# Patient Record
Sex: MALE
[De-identification: names, ages, dates, MRNs, and addresses within clinical notes are randomized per-mention and may not be internally consistent; named-entity substitution may affect disease eponyms.]

---

## 2017-02-15 ENCOUNTER — APPOINTMENT (OUTPATIENT)
Dept: INTERNAL MEDICINE | Facility: CLINIC | Age: 82
End: 2017-02-15

## 2017-02-15 DIAGNOSIS — Z00.00 ENCOUNTER FOR GENERAL ADULT MEDICAL EXAMINATION W/OUT ABNORMAL FINDINGS: ICD-10-CM

## 2017-02-16 ENCOUNTER — RESULT CHARGE (OUTPATIENT)
Age: 82
End: 2017-02-16

## 2017-02-16 LAB
GLUCOSE UR-MCNC: 0
HGB UR QL STRIP.AUTO: 0
PROT UR STRIP-MCNC: 0
SP GR UR STRIP: 1.02

## 2017-04-20 ENCOUNTER — INPATIENT (INPATIENT)
Facility: HOSPITAL | Age: 82
LOS: 4 days | Discharge: HOME | End: 2017-04-25
Attending: INTERNAL MEDICINE | Admitting: INTERNAL MEDICINE
Payer: MEDICARE

## 2017-04-20 VITALS
TEMPERATURE: 98 F | OXYGEN SATURATION: 95 % | HEART RATE: 99 BPM | SYSTOLIC BLOOD PRESSURE: 122 MMHG | RESPIRATION RATE: 17 BRPM | WEIGHT: 203.05 LBS | HEIGHT: 69 IN | DIASTOLIC BLOOD PRESSURE: 60 MMHG

## 2017-04-20 LAB
ABO RH CONFIRMATION: SIGNIFICANT CHANGE UP
ALBUMIN SERPL ELPH-MCNC: 3.7 G/DL — SIGNIFICANT CHANGE UP (ref 3.3–5)
ALP SERPL-CCNC: 84 U/L — SIGNIFICANT CHANGE UP (ref 40–120)
ALT FLD-CCNC: 46 U/L — SIGNIFICANT CHANGE UP (ref 12–78)
ANION GAP SERPL CALC-SCNC: 13 MMOL/L — SIGNIFICANT CHANGE UP (ref 5–17)
ANISOCYTOSIS BLD QL: SLIGHT — SIGNIFICANT CHANGE UP
AST SERPL-CCNC: 43 U/L — HIGH (ref 15–37)
BILIRUB SERPL-MCNC: 1.2 MG/DL — SIGNIFICANT CHANGE UP (ref 0.2–1.2)
BLD GP AB SCN SERPL QL: SIGNIFICANT CHANGE UP
BLOOD GAS COMMENTS: SIGNIFICANT CHANGE UP
BLOOD GAS SOURCE: SIGNIFICANT CHANGE UP
BUN SERPL-MCNC: 44 MG/DL — HIGH (ref 7–23)
CALCIUM SERPL-MCNC: 8.4 MG/DL — LOW (ref 8.5–10.1)
CHLORIDE SERPL-SCNC: 112 MMOL/L — HIGH (ref 96–108)
CO2 SERPL-SCNC: 19 MMOL/L — LOW (ref 22–31)
CREAT SERPL-MCNC: 1.79 MG/DL — HIGH (ref 0.5–1.3)
D DIMER BLD IA.RAPID-MCNC: 171 NG/ML DDU — SIGNIFICANT CHANGE UP
GLUCOSE SERPL-MCNC: 128 MG/DL — HIGH (ref 70–99)
HCO3 BLDA-SCNC: 16 MMOL/L — LOW (ref 21–29)
HCT VFR BLD CALC: 12.6 % — CRITICAL LOW (ref 39–50)
HGB BLD-MCNC: 4.7 G/DL — CRITICAL LOW (ref 13–17)
HOROWITZ INDEX BLDA+IHG-RTO: 21 — SIGNIFICANT CHANGE UP
HYPOCHROMIA BLD QL: SIGNIFICANT CHANGE UP
INR BLD: 1.69 RATIO — HIGH (ref 0.88–1.16)
LACTATE SERPL-SCNC: 1.7 MMOL/L — SIGNIFICANT CHANGE UP (ref 0.7–2)
LYMPHOCYTES # BLD AUTO: 35 % — SIGNIFICANT CHANGE UP (ref 13–44)
MACROCYTES BLD QL: SLIGHT — SIGNIFICANT CHANGE UP
MCHC RBC-ENTMCNC: 36.3 PG — HIGH (ref 27–34)
MCHC RBC-ENTMCNC: 37.4 GM/DL — HIGH (ref 32–36)
MCV RBC AUTO: 97 FL — SIGNIFICANT CHANGE UP (ref 80–100)
MICROCYTES BLD QL: SLIGHT — SIGNIFICANT CHANGE UP
MONOCYTES NFR BLD AUTO: 1 % — LOW (ref 2–14)
NEUTROPHILS NFR BLD AUTO: 64 % — SIGNIFICANT CHANGE UP (ref 43–77)
OVALOCYTES BLD QL SMEAR: SLIGHT — SIGNIFICANT CHANGE UP
PCO2 BLDA: 18 MMHG — LOW (ref 32–46)
PH BLD: 7.56 — HIGH (ref 7.35–7.45)
PLAT MORPH BLD: NORMAL — SIGNIFICANT CHANGE UP
PLATELET # BLD AUTO: 303 K/UL — SIGNIFICANT CHANGE UP (ref 150–400)
PO2 BLDA: 130 MMHG — HIGH (ref 74–108)
POTASSIUM SERPL-MCNC: 4.3 MMOL/L — SIGNIFICANT CHANGE UP (ref 3.5–5.3)
POTASSIUM SERPL-SCNC: 4.3 MMOL/L — SIGNIFICANT CHANGE UP (ref 3.5–5.3)
PROT SERPL-MCNC: 6 GM/DL — SIGNIFICANT CHANGE UP (ref 6–8.3)
PROTHROM AB SERPL-ACNC: 18.6 SEC — HIGH (ref 9.8–12.7)
RBC # BLD: 1.3 M/UL — LOW (ref 4.2–5.8)
RBC # FLD: 18.6 % — HIGH (ref 11–15)
RBC BLD AUTO: ABNORMAL
SAO2 % BLDA: 99 % — HIGH (ref 92–96)
SODIUM SERPL-SCNC: 144 MMOL/L — SIGNIFICANT CHANGE UP (ref 135–145)
WBC # BLD: 3.2 K/UL — LOW (ref 3.8–10.5)
WBC # FLD AUTO: 3.2 K/UL — LOW (ref 3.8–10.5)

## 2017-04-20 PROCEDURE — 99285 EMERGENCY DEPT VISIT HI MDM: CPT

## 2017-04-20 PROCEDURE — 71010: CPT | Mod: 26

## 2017-04-20 RX ORDER — SODIUM CHLORIDE 9 MG/ML
1000 INJECTION, SOLUTION INTRAVENOUS
Qty: 0 | Refills: 0 | Status: DISCONTINUED | OUTPATIENT
Start: 2017-04-20 | End: 2017-04-25

## 2017-04-20 NOTE — H&P ADULT. - ASSESSMENT
89 years old male is admitted for severe anemia, acute renal insufficiency, h/o ASHD  plan: IV hydration. PRBC transfusion. Anemia workup

## 2017-04-20 NOTE — ED PROVIDER NOTE - OBJECTIVE STATEMENT
89 yo male presents with diffculty breathing over last few days. Patient denies chest pain, shortness of breath, fever, chills, fall.

## 2017-04-20 NOTE — H&P ADULT. - HISTORY OF PRESENT ILLNESS
89 years old male with h/o ASHD, s/p CABG, recurrent anemia, s/p transfusion at [facility] come to ER for dyspnea

## 2017-04-20 NOTE — ED ADULT NURSE REASSESSMENT NOTE - NS ED NURSE REASSESS COMMENT FT1
Patient states that he cannot recall which medications he is taking, he cannot recall which pharmacy he uses either.

## 2017-04-20 NOTE — ED ADULT NURSE NOTE - OBJECTIVE STATEMENT
Patient appears alert, oriented x 2-3, no complaints of pain but states right leg feels cold and as of this morning he feels like he cant walk on it properly. States the cold wind hitting his leg makes it worse.

## 2017-04-20 NOTE — ED ADULT TRIAGE NOTE - CHIEF COMPLAINT QUOTE
intermittent sob on exertion with dizziness, denies any chest pain at triage. No apparent respiratory distress noted at triage

## 2017-04-21 DIAGNOSIS — D64.9 ANEMIA, UNSPECIFIED: ICD-10-CM

## 2017-04-21 LAB
ANION GAP SERPL CALC-SCNC: 14 MMOL/L — SIGNIFICANT CHANGE UP (ref 5–17)
BASOPHILS NFR BLD AUTO: SIGNIFICANT CHANGE UP % (ref 0–2)
BUN SERPL-MCNC: 41 MG/DL — HIGH (ref 7–23)
CALCIUM SERPL-MCNC: 8.1 MG/DL — LOW (ref 8.5–10.1)
CHLORIDE SERPL-SCNC: 113 MMOL/L — HIGH (ref 96–108)
CO2 SERPL-SCNC: 18 MMOL/L — LOW (ref 22–31)
CREAT SERPL-MCNC: 1.66 MG/DL — HIGH (ref 0.5–1.3)
EOSINOPHIL NFR BLD AUTO: SIGNIFICANT CHANGE UP % (ref 0–6)
FERRITIN SERPL-MCNC: 925.6 NG/ML — HIGH (ref 30–400)
FOLATE SERPL-MCNC: 12.9 NG/ML — SIGNIFICANT CHANGE UP (ref 4.8–24.2)
GLUCOSE SERPL-MCNC: 106 MG/DL — HIGH (ref 70–99)
HCT VFR BLD CALC: 19 % — CRITICAL LOW (ref 39–50)
HGB BLD-MCNC: 7.2 G/DL — LOW (ref 13–17)
IRON SATN MFR SERPL: 204 UG/DL — HIGH (ref 45–165)
IRON SATN MFR SERPL: SIGNIFICANT CHANGE UP % (ref 16–55)
LYMPHOCYTES # BLD AUTO: SIGNIFICANT CHANGE UP % (ref 13–44)
MCHC RBC-ENTMCNC: 35.9 PG — HIGH (ref 27–34)
MCHC RBC-ENTMCNC: 37.7 GM/DL — HIGH (ref 32–36)
MCV RBC AUTO: 95.3 FL — SIGNIFICANT CHANGE UP (ref 80–100)
MONOCYTES NFR BLD AUTO: SIGNIFICANT CHANGE UP % (ref 2–14)
NEUTROPHILS NFR BLD AUTO: SIGNIFICANT CHANGE UP % (ref 43–77)
PLATELET # BLD AUTO: 273 K/UL — SIGNIFICANT CHANGE UP (ref 150–400)
POTASSIUM SERPL-MCNC: 4 MMOL/L — SIGNIFICANT CHANGE UP (ref 3.5–5.3)
POTASSIUM SERPL-SCNC: 4 MMOL/L — SIGNIFICANT CHANGE UP (ref 3.5–5.3)
RBC # BLD: 1.95 M/UL — LOW (ref 4.2–5.8)
RBC # BLD: 1.99 M/UL — LOW (ref 4.2–5.8)
RBC # FLD: 17 % — HIGH (ref 11–15)
RETICS #: 22 K/UL — LOW (ref 25–125)
RETICS/RBC NFR: 1.1 % — SIGNIFICANT CHANGE UP (ref 0.5–2.5)
SODIUM SERPL-SCNC: 145 MMOL/L — SIGNIFICANT CHANGE UP (ref 135–145)
TIBC SERPL-MCNC: SIGNIFICANT CHANGE UP UG/DL (ref 220–430)
UIBC SERPL-MCNC: <10 UG/DL — LOW (ref 110–370)
VIT B12 SERPL-MCNC: >2000 PG/ML — HIGH (ref 243–894)
WBC # BLD: 6.4 K/UL — SIGNIFICANT CHANGE UP (ref 3.8–10.5)
WBC # FLD AUTO: 6.4 K/UL — SIGNIFICANT CHANGE UP (ref 3.8–10.5)

## 2017-04-21 PROCEDURE — 93306 TTE W/DOPPLER COMPLETE: CPT | Mod: 26

## 2017-04-21 RX ORDER — PANTOPRAZOLE SODIUM 20 MG/1
40 TABLET, DELAYED RELEASE ORAL
Qty: 0 | Refills: 0 | Status: DISCONTINUED | OUTPATIENT
Start: 2017-04-21 | End: 2017-04-25

## 2017-04-21 RX ORDER — SUCRALFATE 1 G
1 TABLET ORAL
Qty: 0 | Refills: 0 | Status: DISCONTINUED | OUTPATIENT
Start: 2017-04-21 | End: 2017-04-25

## 2017-04-21 RX ADMIN — SODIUM CHLORIDE 50 MILLILITER(S): 9 INJECTION, SOLUTION INTRAVENOUS at 06:32

## 2017-04-21 NOTE — CONSULT NOTE ADULT - SUBJECTIVE AND OBJECTIVE BOX
REASON FOR CONSULTATION:    Anemia.      MEDICATIONS  (STANDING):  dextrose 5% + sodium chloride 0.45%. 1000milliLiter(s) IV Continuous <Continuous>    MEDICATIONS  (PRN):      Vital Signs Last 24 Hrs  T(C): 36.2, Max: 36.9 (04-20 @ 20:15)  T(F): 97.2, Max: 98.5 (04-20 @ 20:15)  HR: 67 (61 - 99)  BP: 125/85 (118/45 - 145/46)  BP(mean): --  RR: 18 (17 - 22)  SpO2: 99% (95% - 100%)    PHYSICAL EXAM:  Pallor.        EYES: EOMI, PERRLA, conjunctiva and sclera clear    NECK: Supple, No JVD, Normal thyroid  CHEST/LUNG: Clear to percussion bilaterally;   HEART: Regular rate and rhythm;   ABDOMEN: Soft, Nontender, Nondistended;   EXTREMITIES:  Ecchymoses.  LYMPH: No lymphadenopathy noted        LABS:                        7.2    6.4   )-----------( 273      ( 21 Apr 2017 06:54 )             19.0     04-21    145  |  113<H>  |  41<H>  ----------------------------<  106<H>  4.0   |  18<L>  |  1.66<H>    Ca    8.1<L>      21 Apr 2017 06:54    TPro  6.0  /  Alb  3.7  /  TBili  1.2  /  DBili  x   /  AST  43<H>  /  ALT  46  /  AlkPhos  84  04-20    PT/INR - ( 20 Apr 2017 15:46 )   PT: 18.6 sec;   INR: 1.69 ratio                 RADIOLOGY & ADDITIONAL STUDIES:    PATHOLOGY:

## 2017-04-21 NOTE — PROGRESS NOTE ADULT - SUBJECTIVE AND OBJECTIVE BOX
Patient is a 89y old  Male who presents with a chief complaint of Dyspnea (20 Apr 2017 20:08)    MEDICAL PROBLEMS:  ANEMIA  SHORTNESS OF BREATH  Anemia  Anemia  Anemia      INTERVAL HPI/OVERNIGHT EVENTS: Received 2 u PRBC. Hba1c: 7.5. No sign of GI bleeding    MEDICATIONS  (STANDING):  dextrose 5% + sodium chloride 0.45%. 1000milliLiter(s) IV Continuous <Continuous>    MEDICATIONS  (PRN):    Allergies    No Known Allergies    Intolerances      Vital Signs Last 24 Hrs  T(C): 36.2, Max: 36.9 (04-20 @ 20:15)  T(F): 97.2, Max: 98.5 (04-20 @ 20:15)  HR: 67 (61 - 99)  BP: 125/85 (118/45 - 145/46)  BP(mean): --  RR: 18 (17 - 22)  SpO2: 99% (95% - 100%)    PHYSICAL EXAM:  GENERAL: NAD, well-groomed, well-developed  HEAD:  Atraumatic, Normocephalic  EYES: EOMI, PERRLA, conjunctiva and sclera clear  ENMT: No tonsillar erythema, exudates, or enlargement; Moist mucous membranes, Good dentition, No lesions  NECK: Supple, No JVD, Normal thyroid  NERVOUS SYSTEM:  Alert & Oriented X3, Good concentration; Motor Strength 5/5 B/L upper and lower extremities; DTRs 2+ intact and symmetric  CHEST/LUNG: Clear to percussion bilaterally; No rales, rhonchi, wheezing, or rubs  HEART: Regular rate and rhythm; No murmurs, rubs, or gallops  ABDOMEN: Soft, Nontender, Nondistended; Bowel sounds present  EXTREMITIES:  2+ Peripheral Pulses, No clubbing, cyanosis, or edema  LYMPH: No lymphadenopathy noted  SKIN: No rashes or lesions  I & Os for 24h ending 04-21 @ 07:00  =============================================  IN: 817 ml / OUT: 200 ml / NET: 617 ml    I & Os for current day (as of 04-21 @ 11:43)  =============================================  IN: 380 ml / OUT: 0 ml / NET: 380 ml      LABS:                        7.2    6.4   )-----------( 273      ( 21 Apr 2017 06:54 )             19.0     04-21    145  |  113<H>  |  41<H>  ----------------------------<  106<H>  4.0   |  18<L>  |  1.66<H>    Ca    8.1<L>      21 Apr 2017 06:54    TPro  6.0  /  Alb  3.7  /  TBili  1.2  /  DBili  x   /  AST  43<H>  /  ALT  46  /  AlkPhos  84  04-20    PT/INR - ( 20 Apr 2017 15:46 )   PT: 18.6 sec;   INR: 1.69 ratio             CAPILLARY BLOOD GLUCOSE    RADIOLOGY & ADDITIONAL TESTS:    Imaging Personally Reviewed:  [X] YES  [ ] NO    Consultant(s) Notes Reviewed:  [X] YES  [ ] NO    Care Discussed with Consultants/Other Providers [X] YES  [ ] NO

## 2017-04-22 LAB
ALBUMIN SERPL ELPH-MCNC: 3.4 G/DL — SIGNIFICANT CHANGE UP (ref 3.3–5)
ALP SERPL-CCNC: 95 U/L — SIGNIFICANT CHANGE UP (ref 40–120)
ALT FLD-CCNC: 47 U/L — SIGNIFICANT CHANGE UP (ref 12–78)
ANION GAP SERPL CALC-SCNC: 10 MMOL/L — SIGNIFICANT CHANGE UP (ref 5–17)
AST SERPL-CCNC: 50 U/L — HIGH (ref 15–37)
BASOPHILS # BLD AUTO: 0.1 K/UL — SIGNIFICANT CHANGE UP (ref 0–0.2)
BASOPHILS NFR BLD AUTO: 1 % — SIGNIFICANT CHANGE UP (ref 0–2)
BILIRUB SERPL-MCNC: 2.4 MG/DL — HIGH (ref 0.2–1.2)
BUN SERPL-MCNC: 26 MG/DL — HIGH (ref 7–23)
CALCIUM SERPL-MCNC: 8.1 MG/DL — LOW (ref 8.5–10.1)
CHLORIDE SERPL-SCNC: 114 MMOL/L — HIGH (ref 96–108)
CO2 SERPL-SCNC: 21 MMOL/L — LOW (ref 22–31)
CREAT SERPL-MCNC: 1.34 MG/DL — HIGH (ref 0.5–1.3)
EOSINOPHIL # BLD AUTO: 0.3 K/UL — SIGNIFICANT CHANGE UP (ref 0–0.5)
EOSINOPHIL NFR BLD AUTO: 3 % — SIGNIFICANT CHANGE UP (ref 0–6)
FOLATE SERPL-MCNC: 10.8 NG/ML — SIGNIFICANT CHANGE UP (ref 4.8–24.2)
GLUCOSE SERPL-MCNC: 128 MG/DL — HIGH (ref 70–99)
HCT VFR BLD CALC: 26.9 % — LOW (ref 39–50)
HGB BLD-MCNC: 9.8 G/DL — LOW (ref 13–17)
IRON SATN MFR SERPL: 200 UG/DL — HIGH (ref 45–165)
IRON SATN MFR SERPL: 95 % — HIGH (ref 16–55)
LYMPHOCYTES # BLD AUTO: 0.7 K/UL — LOW (ref 1–3.3)
LYMPHOCYTES # BLD AUTO: 8.3 % — LOW (ref 13–44)
MCHC RBC-ENTMCNC: 33.8 PG — SIGNIFICANT CHANGE UP (ref 27–34)
MCHC RBC-ENTMCNC: 36.6 GM/DL — HIGH (ref 32–36)
MCV RBC AUTO: 92.5 FL — SIGNIFICANT CHANGE UP (ref 80–100)
MONOCYTES # BLD AUTO: 0.5 K/UL — SIGNIFICANT CHANGE UP (ref 0–0.9)
MONOCYTES NFR BLD AUTO: 5.8 % — SIGNIFICANT CHANGE UP (ref 2–14)
NEUTROPHILS # BLD AUTO: 6.8 K/UL — SIGNIFICANT CHANGE UP (ref 1.8–7.4)
NEUTROPHILS NFR BLD AUTO: 77.9 % — HIGH (ref 43–77)
PLATELET # BLD AUTO: 274 K/UL — SIGNIFICANT CHANGE UP (ref 150–400)
POTASSIUM SERPL-MCNC: 3.6 MMOL/L — SIGNIFICANT CHANGE UP (ref 3.5–5.3)
POTASSIUM SERPL-SCNC: 3.6 MMOL/L — SIGNIFICANT CHANGE UP (ref 3.5–5.3)
PROT SERPL-MCNC: 5.7 G/DL — LOW (ref 6–8.3)
PROT SERPL-MCNC: 5.7 G/DL — LOW (ref 6–8.3)
PROT SERPL-MCNC: 5.8 GM/DL — LOW (ref 6–8.3)
RBC # BLD: 2.91 M/UL — LOW (ref 4.2–5.8)
RBC # FLD: 19 % — HIGH (ref 11–15)
SODIUM SERPL-SCNC: 145 MMOL/L — SIGNIFICANT CHANGE UP (ref 135–145)
TIBC SERPL-MCNC: 210 UG/DL — LOW (ref 220–430)
UIBC SERPL-MCNC: 10 UG/DL — LOW (ref 110–370)
VIT B12 SERPL-MCNC: >2000 PG/ML — HIGH (ref 243–894)
WBC # BLD: 8.8 K/UL — SIGNIFICANT CHANGE UP (ref 3.8–10.5)
WBC # FLD AUTO: 8.8 K/UL — SIGNIFICANT CHANGE UP (ref 3.8–10.5)

## 2017-04-22 RX ADMIN — SODIUM CHLORIDE 50 MILLILITER(S): 9 INJECTION, SOLUTION INTRAVENOUS at 06:09

## 2017-04-22 NOTE — PROGRESS NOTE ADULT - SUBJECTIVE AND OBJECTIVE BOX
Patient is a 89y old  Male who presents with a chief complaint of Dyspnea (20 Apr 2017 20:08)    MEDICAL PROBLEMS:  ANEMIA  SHORTNESS OF BREATH  Anemia  Anemia  Anemia      INTERVAL HPI/OVERNIGHT EVENTS: Received total 4 u PRBC, hb: 9.8 today. 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 1000milliLiter(s) IV Continuous <Continuous>  pantoprazole    Tablet 40milliGRAM(s) Oral before breakfast  sucralfate suspension 1Gram(s) Oral four times a day    MEDICATIONS  (PRN):    Allergies    No Known Allergies    Intolerances      Vital Signs Last 24 Hrs  T(C): 36.4, Max: 37.1 (04-21 @ 16:01)  T(F): 97.6, Max: 98.8 (04-21 @ 16:01)  HR: 77 (65 - 98)  BP: 131/62 (121/65 - 140/65)  BP(mean): --  RR: 18 (18 - 18)  SpO2: 98% (96% - 100%)    PHYSICAL EXAM:  GENERAL: NAD, well-groomed, well-developed  HEAD:  Atraumatic, Normocephalic  EYES: EOMI, PERRLA, conjunctiva and sclera clear  ENMT: No tonsillar erythema, exudates, or enlargement; Moist mucous membranes, Good dentition, No lesions  NECK: Supple, No JVD, Normal thyroid  NERVOUS SYSTEM:  Alert & Oriented X3, Good concentration; Motor Strength 5/5 B/L upper and lower extremities; DTRs 2+ intact and symmetric  CHEST/LUNG: Clear to percussion bilaterally; No rales, rhonchi, wheezing, or rubs  HEART: Regular rate and rhythm; No murmurs, rubs, or gallops  ABDOMEN: Soft, Nontender, Nondistended; Bowel sounds present  EXTREMITIES:  2+ Peripheral Pulses, No clubbing, cyanosis, or edema  LYMPH: No lymphadenopathy noted  SKIN: No rashes or lesions    I & Os for current day (as of 04-22 @ 09:45)  =============================================  IN: 660 ml / OUT: 1000 ml / NET: -340 ml      LABS:                        9.8    8.8   )-----------( 274      ( 22 Apr 2017 08:24 )             26.9     04-22    145  |  114<H>  |  26<H>  ----------------------------<  128<H>  3.6   |  21<L>  |  1.34<H>    Ca    8.1<L>      22 Apr 2017 08:24    TPro  5.8<L>  /  Alb  3.4  /  TBili  2.4<H>  /  DBili  x   /  AST  50<H>  /  ALT  47  /  AlkPhos  95  04-22    PT/INR - ( 20 Apr 2017 15:46 )   PT: 18.6 sec;   INR: 1.69 ratio             CAPILLARY BLOOD GLUCOSE    RADIOLOGY & ADDITIONAL TESTS:    Imaging Personally Reviewed:  [X] YES  [ ] NO    Consultant(s) Notes Reviewed:  [X] YES  [ ] NO    Care Discussed with Consultants/Other Providers [X] YES  [ ] NO

## 2017-04-22 NOTE — PROGRESS NOTE ADULT - SUBJECTIVE AND OBJECTIVE BOX
INTERVAL HISTORY:    Weak.  Anemic      MEDICATIONS  (STANDING):  dextrose 5% + sodium chloride 0.45%. 1000milliLiter(s) IV Continuous <Continuous>  pantoprazole    Tablet 40milliGRAM(s) Oral before breakfast  sucralfate suspension 1Gram(s) Oral four times a day    MEDICATIONS  (PRN):      Vital Signs Last 24 Hrs  T(C): 36.1, Max: 37.1 (04-21 @ 18:46)  T(F): 97, Max: 98.8 (04-21 @ 18:46)  HR: 73 (65 - 98)  BP: 153/74 (118/50 - 153/74)  BP(mean): --  RR: 20 (18 - 21)  SpO2: 99% (98% - 100%)    PHYSICAL EXAM:    GENERAL: Pallor.  HEAD:  Atraumatic, Normocephalic  EYES: EOMI, PERRLA, conjunctiva and sclera clear    NECK: Supple, No JVD, Normal thyroid     CHEST/LUNG: Clear to percussion bilaterally; No rales, rhonchi,   HEART: Regular rate and rhythm;   ABDOMEN: Soft, Nontender.    LYMPH: No lymphadenopathy noted        LABS:                        9.8    8.8   )-----------( 274      ( 22 Apr 2017 08:24 )             26.9     04-22    145  |  114<H>  |  26<H>  ----------------------------<  128<H>  3.6   |  21<L>  |  1.34<H>    Ca    8.1<L>      22 Apr 2017 08:24    TPro  5.7<L>  /  Alb  x   /  TBili  x   /  DBili  x   /  AST  x   /  ALT  x   /  AlkPhos  x   04-22            RADIOLOGY & ADDITIONAL STUDIES:    PATHOLOGY:

## 2017-04-23 ENCOUNTER — RESULT REVIEW (OUTPATIENT)
Age: 82
End: 2017-04-23

## 2017-04-23 RX ADMIN — SODIUM CHLORIDE 50 MILLILITER(S): 9 INJECTION, SOLUTION INTRAVENOUS at 05:08

## 2017-04-23 NOTE — PROGRESS NOTE ADULT - SUBJECTIVE AND OBJECTIVE BOX
INTERVAL HISTORY:    Pallor.      MEDICATIONS  (STANDING):  dextrose 5% + sodium chloride 0.45%. 1000milliLiter(s) IV Continuous <Continuous>  pantoprazole    Tablet 40milliGRAM(s) Oral before breakfast  sucralfate suspension 1Gram(s) Oral four times a day    MEDICATIONS  (PRN):      Vital Signs Last 24 Hrs  T(C): 36.1, Max: 36.6 (04-22 @ 23:57)  T(F): 97, Max: 97.8 (04-22 @ 23:57)  HR: 64 (64 - 73)  BP: 125/55 (114/57 - 153/74)  BP(mean): --  RR: 18 (18 - 21)  SpO2: 98% (98% - 100%)    PHYSICAL EXAM:       HEAD:  Atraumatic, Normocephalic  EYES: EOMI, PERRLA, conjunctiva and sclera clear    NECK: Supple, No JVD, Normal thyroid  NERVOUS SYSTEM:    CHEST/LUNG: Clear to percussion bilaterally; No rales, rhonchi,   HEART: Regular rate and rhythm;   ABDOMEN: Soft, Nontender.  LYMPH: No lymphadenopathy noted        LABS:                        9.8    8.8   )-----------( 274      ( 22 Apr 2017 08:24 )             26.9     04-22    145  |  114<H>  |  26<H>  ----------------------------<  128<H>  3.6   |  21<L>  |  1.34<H>    Ca    8.1<L>      22 Apr 2017 08:24    TPro  5.7<L>  /  Alb  x   /  TBili  x   /  DBili  x   /  AST  x   /  ALT  x   /  AlkPhos  x   04-22            RADIOLOGY & ADDITIONAL STUDIES:    PATHOLOGY:

## 2017-04-23 NOTE — PROGRESS NOTE ADULT - SUBJECTIVE AND OBJECTIVE BOX
Patient is a 89y old  Male who presents with a chief complaint of Dyspnea (20 Apr 2017 20:08)    MEDICAL PROBLEMS:  ANEMIA  SHORTNESS OF BREATH  Anemia  Anemia  Anemia      INTERVAL HPI/OVERNIGHT EVENTS: Alert, OOB to chair.    MEDICATIONS  (STANDING):  dextrose 5% + sodium chloride 0.45%. 1000milliLiter(s) IV Continuous <Continuous>  pantoprazole    Tablet 40milliGRAM(s) Oral before breakfast  sucralfate suspension 1Gram(s) Oral four times a day    MEDICATIONS  (PRN):    Allergies    No Known Allergies    Intolerances      Vital Signs Last 24 Hrs  T(C): 36.1, Max: 36.6 (04-22 @ 23:57)  T(F): 97, Max: 97.8 (04-22 @ 23:57)  HR: 64 (64 - 73)  BP: 125/55 (114/57 - 153/74)  BP(mean): --  RR: 18 (18 - 21)  SpO2: 98% (98% - 100%)    PHYSICAL EXAM:  GENERAL: NAD, well-groomed, well-developed  HEAD:  Atraumatic, Normocephalic  EYES: EOMI, PERRLA, conjunctiva and sclera clear  ENMT: No tonsillar erythema, exudates, or enlargement; Moist mucous membranes, Good dentition, No lesions  NECK: Supple, No JVD, Normal thyroid  NERVOUS SYSTEM:  Alert & Oriented X3, Good concentration; Motor Strength 5/5 B/L upper and lower extremities; DTRs 2+ intact and symmetric  CHEST/LUNG: Clear to percussion bilaterally; No rales, rhonchi, wheezing, or rubs  HEART: Regular rate and rhythm; No murmurs, rubs, or gallops  ABDOMEN: Soft, Nontender, Nondistended; Bowel sounds present  EXTREMITIES:  2+ Peripheral Pulses, No clubbing, cyanosis, or edema  LYMPH: No lymphadenopathy noted  SKIN: No rashes or lesions  I & Os for 24h ending 04-23 @ 07:00  =============================================  IN: 860 ml / OUT: 900 ml / NET: -40 ml    I & Os for current day (as of 04-23 @ 11:14)  =============================================  IN: 240 ml / OUT: 0 ml / NET: 240 ml      LABS:                        9.8    8.8   )-----------( 274      ( 22 Apr 2017 08:24 )             26.9     04-22    145  |  114<H>  |  26<H>  ----------------------------<  128<H>  3.6   |  21<L>  |  1.34<H>    Ca    8.1<L>      22 Apr 2017 08:24    TPro  5.7<L>  /  Alb  x   /  TBili  x   /  DBili  x   /  AST  x   /  ALT  x   /  AlkPhos  x   04-22        CAPILLARY BLOOD GLUCOSE    RADIOLOGY & ADDITIONAL TESTS:    Imaging Personally Reviewed:  [X] YES  [ ] NO    Consultant(s) Notes Reviewed:  [X] YES  [ ] NO    Care Discussed with Consultants/Other Providers [X] YES  [ ] NO

## 2017-04-24 DIAGNOSIS — D64.9 ANEMIA, UNSPECIFIED: ICD-10-CM

## 2017-04-24 LAB
% ALBUMIN: 62.3 % — SIGNIFICANT CHANGE UP
% ALPHA 1: 4.7 % — SIGNIFICANT CHANGE UP
% ALPHA 2: 9.7 % — SIGNIFICANT CHANGE UP
% BETA: 9.3 % — SIGNIFICANT CHANGE UP
% GAMMA: 14 % — SIGNIFICANT CHANGE UP
ALBUMIN SERPL ELPH-MCNC: 3.6 G/DL — SIGNIFICANT CHANGE UP (ref 3.6–5.5)
ALBUMIN/GLOB SERPL ELPH: 1.7 RATIO — SIGNIFICANT CHANGE UP
ALPHA1 GLOB SERPL ELPH-MCNC: 0.3 G/DL — SIGNIFICANT CHANGE UP (ref 0.1–0.4)
ALPHA2 GLOB SERPL ELPH-MCNC: 0.6 G/DL — SIGNIFICANT CHANGE UP (ref 0.5–1)
ANISOCYTOSIS BLD QL: SLIGHT — SIGNIFICANT CHANGE UP
B-GLOBULIN SERPL ELPH-MCNC: 0.5 G/DL — SIGNIFICANT CHANGE UP (ref 0.5–1)
EOSINOPHIL NFR BLD AUTO: 11 % — HIGH (ref 0–6)
GAMMA GLOBULIN: 0.8 G/DL — SIGNIFICANT CHANGE UP (ref 0.6–1.6)
HCT VFR BLD CALC: 23.8 % — LOW (ref 39–50)
HGB BLD-MCNC: 8.8 G/DL — LOW (ref 13–17)
HYPOCHROMIA BLD QL: SLIGHT — SIGNIFICANT CHANGE UP
LYMPHOCYTES # BLD AUTO: 38 % — SIGNIFICANT CHANGE UP (ref 13–44)
MCHC RBC-ENTMCNC: 34.1 PG — HIGH (ref 27–34)
MCHC RBC-ENTMCNC: 36.8 GM/DL — HIGH (ref 32–36)
MCV RBC AUTO: 92.8 FL — SIGNIFICANT CHANGE UP (ref 80–100)
MICROCYTES BLD QL: SLIGHT — SIGNIFICANT CHANGE UP
MONOCYTES NFR BLD AUTO: 6 % — SIGNIFICANT CHANGE UP (ref 2–14)
NEUTROPHILS NFR BLD AUTO: 45 % — SIGNIFICANT CHANGE UP (ref 43–77)
PLAT MORPH BLD: NORMAL — SIGNIFICANT CHANGE UP
PLATELET # BLD AUTO: 222 K/UL — SIGNIFICANT CHANGE UP (ref 150–400)
PROT PATTERN SERPL ELPH-IMP: SIGNIFICANT CHANGE UP
RBC # BLD: 2.57 M/UL — LOW (ref 4.2–5.8)
RBC # FLD: 17.3 % — HIGH (ref 11–15)
RBC BLD AUTO: ABNORMAL
WBC # BLD: 6.9 K/UL — SIGNIFICANT CHANGE UP (ref 3.8–10.5)
WBC # FLD AUTO: 6.9 K/UL — SIGNIFICANT CHANGE UP (ref 3.8–10.5)

## 2017-04-24 PROCEDURE — 88313 SPECIAL STAINS GROUP 2: CPT | Mod: 26

## 2017-04-24 PROCEDURE — 85097 BONE MARROW INTERPRETATION: CPT

## 2017-04-24 PROCEDURE — 88342 IMHCHEM/IMCYTCHM 1ST ANTB: CPT | Mod: 26,59

## 2017-04-24 PROCEDURE — 88360 TUMOR IMMUNOHISTOCHEM/MANUAL: CPT | Mod: 26

## 2017-04-24 PROCEDURE — 77012 CT SCAN FOR NEEDLE BIOPSY: CPT | Mod: 26

## 2017-04-24 PROCEDURE — 88311 DECALCIFY TISSUE: CPT | Mod: 26

## 2017-04-24 PROCEDURE — 88341 IMHCHEM/IMCYTCHM EA ADD ANTB: CPT | Mod: 26,59

## 2017-04-24 PROCEDURE — 88305 TISSUE EXAM BY PATHOLOGIST: CPT | Mod: 26

## 2017-04-24 PROCEDURE — 10022: CPT

## 2017-04-24 PROCEDURE — 99222 1ST HOSP IP/OBS MODERATE 55: CPT

## 2017-04-24 NOTE — PROGRESS NOTE ADULT - SUBJECTIVE AND OBJECTIVE BOX
Patient s/p posterior right iliac bone biopsy and marrow aspiration    Operators: Yasmeen Nicholas MD    Findings: core bone x1, marrow aspirate x2    Complications: None.    Blood loss: < 20 ml      ASSESSMENT:  88 y/o male with persistent anemia s/p transfusion    PLAN:  s/p bone marrow aspiration and biopsy  f/u results  f/u Hem/Onc

## 2017-04-24 NOTE — CONSULT NOTE ADULT - SUBJECTIVE AND OBJECTIVE BOX
Patient is a 89y old  Male who presents with a chief complaint of Dyspnea (20 Apr 2017 20:08)      HPI:  89 years old male with h/o ASHD, s/p CABG, recurrent anemia, s/p transfusion at [facility] come to ER for dyspnea (20 Apr 2017 20:08)    IR consulted for bone marrow bx/aspirate.  Pt denies any fevers, night sweats, fevers, h/o cancers+ itchiness to forearms.      ROS  as above    PAST MEDICAL & SURGICAL HISTORY:  Anemia      Allergies    No Known Allergies    Intolerances        MEDICATIONS  (STANDING):  dextrose 5% + sodium chloride 0.45%. 1000milliLiter(s) IV Continuous <Continuous>  pantoprazole    Tablet 40milliGRAM(s) Oral before breakfast  sucralfate suspension 1Gram(s) Oral four times a day    MEDICATIONS  (PRN):        SOCIAL HISTORY:    FAMILY HISTORY:        PHYSICAL EXAM:    Vital Signs Last 24 Hrs  T(C): 36.7, Max: 36.7 (04-23 @ 23:57)  T(F): 98, Max: 98 (04-23 @ 23:57)  HR: 78 (71 - 78)  BP: 125/53 (115/48 - 132/61)  BP(mean): --  RR: 18 (18 - 18)  SpO2: 98% (96% - 98%)    General:  Appears stated age, well-groomed, well-nourished, no distress  Lungs:  CTAB  Cardiovascular:  good S1, S2,   Abdomen:  Soft, non-tender, non-distended,   Extremities:  no calf tenderness/swelling b/l  Musculoskeletal:  Full ROM in all joints w/o swelling/tenderness/effusion  Neuro/Psych:  A &O x 3    LABS:                        8.8    6.9   )-----------( 222      ( 24 Apr 2017 06:44 )             23.8         TPro  5.7<L>  /  Alb  x   /  TBili  x   /  DBili  x   /  AST  x   /  ALT  x   /  AlkPhos  x   04-22

## 2017-04-24 NOTE — PROGRESS NOTE ADULT - SUBJECTIVE AND OBJECTIVE BOX
Patient is a 89y old  Male who presents with a chief complaint of Dyspnea (20 Apr 2017 20:08)    MEDICAL PROBLEMS:  ANEMIA  SHORTNESS OF BREATH  Anemia  Anemia  Anemia, unspecified type  Anemia      INTERVAL HPI/OVERNIGHT EVENTS: Severe anemia due to BM disease    MEDICATIONS  (STANDING):  dextrose 5% + sodium chloride 0.45%. 1000milliLiter(s) IV Continuous <Continuous>  pantoprazole    Tablet 40milliGRAM(s) Oral before breakfast  sucralfate suspension 1Gram(s) Oral four times a day    MEDICATIONS  (PRN):    Allergies    No Known Allergies    Intolerances      Vital Signs Last 24 Hrs  T(C): 36.7, Max: 36.7 (04-23 @ 23:57)  T(F): 98, Max: 98 (04-23 @ 23:57)  HR: 78 (71 - 78)  BP: 125/53 (115/48 - 132/61)  BP(mean): --  RR: 18 (18 - 18)  SpO2: 98% (96% - 98%)    PHYSICAL EXAM:  GENERAL: NAD, well-groomed, well-developed  HEAD:  Atraumatic, Normocephalic  EYES: EOMI, PERRLA, conjunctiva and sclera clear  ENMT: No tonsillar erythema, exudates, or enlargement; Moist mucous membranes, Good dentition, No lesions  NECK: Supple, No JVD, Normal thyroid  NERVOUS SYSTEM:  Alert & Oriented X3, Good concentration; Motor Strength 5/5 B/L upper and lower extremities; DTRs 2+ intact and symmetric  CHEST/LUNG: Clear to percussion bilaterally; No rales, rhonchi, wheezing, or rubs  HEART: Regular rate and rhythm; No murmurs, rubs, or gallops  ABDOMEN: Soft, Nontender, Nondistended; Bowel sounds present  EXTREMITIES:  2+ Peripheral Pulses, No clubbing, cyanosis, or edema  LYMPH: No lymphadenopathy noted  SKIN: No rashes or lesions  I & Os for 24h ending 04-24 @ 07:00  =============================================  IN: 1080 ml / OUT: 1550 ml / NET: -470 ml    I & Os for current day (as of 04-24 @ 11:12)  =============================================  IN: 120 ml / OUT: 0 ml / NET: 120 ml      LABS:                        8.8    6.9   )-----------( 222      ( 24 Apr 2017 06:44 )             23.8               CAPILLARY BLOOD GLUCOSE    RADIOLOGY & ADDITIONAL TESTS:    Imaging Personally Reviewed:  [X] YES  [ ] NO    Consultant(s) Notes Reviewed:  [X] YES  [ ] NO    Care Discussed with Consultants/Other Providers [X] YES  [ ] NO

## 2017-04-24 NOTE — PROGRESS NOTE ADULT - SUBJECTIVE AND OBJECTIVE BOX
Pt stable - no active bleeding  Pt had Bone Marrow biopsy today      MEDICATIONS  (STANDING):  dextrose 5% + sodium chloride 0.45%. 1000milliLiter(s) IV Continuous <Continuous>  pantoprazole    Tablet 40milliGRAM(s) Oral before breakfast  sucralfate suspension 1Gram(s) Oral four times a day    MEDICATIONS  (PRN):      Allergies    No Known Allergies    Intolerances        Vital Signs Last 24 Hrs  T(C): 36.7, Max: 36.7 (04-23 @ 23:57)  T(F): 98, Max: 98 (04-23 @ 23:57)  HR: 76 (67 - 78)  BP: 134/68 (123/60 - 152/76)  BP(mean): --  RR: 17 (17 - 18)  SpO2: 99% (96% - 100%)    PHYSICAL EXAM:  General: NAD.  CVS: S1, S2  Chest: air entry bilaterally present  Abd: BS present, soft, non-tender      LABS:                        8.8    6.9   )-----------( 222      ( 24 Apr 2017 06:44 )             23.8       Follow H/H  will discuss with Hematology after BM results available

## 2017-04-24 NOTE — PROGRESS NOTE ADULT - SUBJECTIVE AND OBJECTIVE BOX
INTERVAL HISTORY:    weak.  Anemic      MEDICATIONS  (STANDING):  dextrose 5% + sodium chloride 0.45%. 1000milliLiter(s) IV Continuous <Continuous>  pantoprazole    Tablet 40milliGRAM(s) Oral before breakfast  sucralfate suspension 1Gram(s) Oral four times a day    MEDICATIONS  (PRN):      Vital Signs Last 24 Hrs  T(C): 36.7, Max: 36.7 (04-23 @ 23:57)  T(F): 98, Max: 98 (04-23 @ 23:57)  HR: 78 (71 - 78)  BP: 125/53 (115/48 - 132/61)  BP(mean): --  RR: 18 (18 - 18)  SpO2: 98% (96% - 98%)    PHYSICAL EXAM:    GENERAL: Pallor.,   HEAD:  Atraumatic, Normocephalic  EYES: EOMI, PERRLA, conjunctiva and sclera clear    NECK: Supple, No JVD, Normal thyroid     CHEST/LUNG: Clear to percussion bilaterally; No rales, rhonchi,   HEART: Regular rate and rhythm;   ABDOMEN: Soft, Nontender.  EXTREMITIES:   edema:- nil  LYMPH: No lymphadenopathy noted        LABS:                        8.8    6.9   )-----------( 222      ( 24 Apr 2017 06:44 )             23.8         TPro  5.7<L>  /  Alb  x   /  TBili  x   /  DBili  x   /  AST  x   /  ALT  x   /  AlkPhos  x   04-22            RADIOLOGY & ADDITIONAL STUDIES:    PATHOLOGY:

## 2017-04-24 NOTE — CONSULT NOTE ADULT - ASSESSMENT
88 yo male presenting with SOB, found to be anemic s/p multiple PRBC transfusions  IR consulted for bone marrow bx/aspirate

## 2017-04-25 VITALS
SYSTOLIC BLOOD PRESSURE: 147 MMHG | DIASTOLIC BLOOD PRESSURE: 91 MMHG | OXYGEN SATURATION: 98 % | RESPIRATION RATE: 17 BRPM | HEART RATE: 87 BPM

## 2017-04-25 LAB
BASOPHILS # BLD AUTO: 0.2 K/UL — SIGNIFICANT CHANGE UP (ref 0–0.2)
BASOPHILS NFR BLD AUTO: 2.9 % — HIGH (ref 0–2)
EOSINOPHIL # BLD AUTO: 0.6 K/UL — HIGH (ref 0–0.5)
EOSINOPHIL NFR BLD AUTO: 9.6 % — HIGH (ref 0–6)
HCT VFR BLD CALC: 25.7 % — LOW (ref 39–50)
HGB BLD-MCNC: 9.4 G/DL — LOW (ref 13–17)
LYMPHOCYTES # BLD AUTO: 1.1 K/UL — SIGNIFICANT CHANGE UP (ref 1–3.3)
LYMPHOCYTES # BLD AUTO: 18.2 % — SIGNIFICANT CHANGE UP (ref 13–44)
MCHC RBC-ENTMCNC: 33.7 PG — SIGNIFICANT CHANGE UP (ref 27–34)
MCHC RBC-ENTMCNC: 36.4 GM/DL — HIGH (ref 32–36)
MCV RBC AUTO: 92.7 FL — SIGNIFICANT CHANGE UP (ref 80–100)
MONOCYTES # BLD AUTO: 0.4 K/UL — SIGNIFICANT CHANGE UP (ref 0–0.9)
MONOCYTES NFR BLD AUTO: 5.8 % — SIGNIFICANT CHANGE UP (ref 2–14)
NEUTROPHILS # BLD AUTO: 4 K/UL — SIGNIFICANT CHANGE UP (ref 1.8–7.4)
NEUTROPHILS NFR BLD AUTO: 63.5 % — SIGNIFICANT CHANGE UP (ref 43–77)
PLATELET # BLD AUTO: 255 K/UL — SIGNIFICANT CHANGE UP (ref 150–400)
RBC # BLD: 2.77 M/UL — LOW (ref 4.2–5.8)
RBC # FLD: 18 % — HIGH (ref 11–15)
WBC # BLD: 6.3 K/UL — SIGNIFICANT CHANGE UP (ref 3.8–10.5)
WBC # FLD AUTO: 6.3 K/UL — SIGNIFICANT CHANGE UP (ref 3.8–10.5)

## 2017-04-25 PROCEDURE — 88189 FLOWCYTOMETRY/READ 16 & >: CPT

## 2017-04-25 RX ORDER — PANTOPRAZOLE SODIUM 20 MG/1
1 TABLET, DELAYED RELEASE ORAL
Qty: 30 | Refills: 0 | OUTPATIENT
Start: 2017-04-25

## 2017-04-25 RX ORDER — SUCRALFATE 1 G
10 TABLET ORAL
Qty: 1200 | Refills: 0 | OUTPATIENT
Start: 2017-04-25 | End: 2017-05-25

## 2017-04-25 NOTE — DISCHARGE NOTE ADULT - HOSPITAL COURSE
89 years old male with h/o ASHD, s/p CABG, recurrent anemia, 7.2/19.  Pt transfused 4 units packed red blood cells,  Started on Protonix and Carafate   GI consult, Dr Ambrocio  Hematology consult Dr Pack  Bone marrow biopsy 4/24/2017 results pending     Pt to discharge home, follow up with Dr Pack in 2 weeks for biopsy results

## 2017-04-25 NOTE — DISCHARGE NOTE ADULT - CARE PROVIDER_API CALL
Kathy Pack), Medical Oncology  [address]  [place] [address]  Phone: [phone]  Fax: [phone]    Shivam Ambrocio), Gastroenterology; Internal Medicine  [address]  [place] [address]  Phone: [phone]  Fax: [phone]

## 2017-04-25 NOTE — PROGRESS NOTE ADULT - SUBJECTIVE AND OBJECTIVE BOX
INTERVAL HISTORY:  Weak  Anemia        MEDICATIONS  (STANDING):  dextrose 5% + sodium chloride 0.45%. 1000milliLiter(s) IV Continuous <Continuous>  pantoprazole    Tablet 40milliGRAM(s) Oral before breakfast  sucralfate suspension 1Gram(s) Oral four times a day    MEDICATIONS  (PRN):      Vital Signs Last 24 Hrs  T(C): 36.6, Max: 37.3 (04-24 @ 17:11)  T(F): 97.8, Max: 99.2 (04-24 @ 17:11)  HR: 101 (67 - 101)  BP: 134/73 (121/52 - 152/76)  BP(mean): --  RR: 18 (17 - 18)  SpO2: 99% (94% - 100%)    PHYSICAL EXAM:    GENERAL: NAD,   HEAD:  Atraumatic, Normocephalic  EYES: EOMI, PERRLA, conjunctiva and sclera clear    NECK: Supple, No JVD, Normal thyroid     CHEST/LUNG: Clear to percussion bilaterally; No rales, rhonchi,   HEART: Regular rate and rhythm;   ABDOMEN: Soft, Nontender.    LYMPH: No lymphadenopathy noted        LABS:                        9.4    6.3   )-----------( 255      ( 25 Apr 2017 07:27 )             25.7                   RADIOLOGY & ADDITIONAL STUDIES:    PATHOLOGY:

## 2017-04-25 NOTE — DISCHARGE NOTE ADULT - PATIENT PORTAL LINK FT
“You can access the Follow[employer] Patient Portal, offered by [facility], by registering with the following website: [email or web address]”

## 2017-04-25 NOTE — DISCHARGE NOTE ADULT - MEDICATION SUMMARY - MEDICATIONS TO TAKE
I will START or STAY ON the medications listed below when I get home from the hospital:    sucralfate 1 g/10 mL oral suspension  -- 10 milliliter(s) by mouth 4 times a day  -- Indication: For Anemia    pantoprazole 40 mg oral delayed release tablet  -- 1 tab(s) by mouth once a day (before a meal)  -- Indication: For Anemia I will START or STAY ON the medications listed below when I get home from the hospital:    pantoprazole 40 mg oral delayed release tablet  -- 1 tab(s) by mouth once a day (before a meal)  -- Indication: For Anemia

## 2017-04-25 NOTE — DISCHARGE NOTE ADULT - CARE PLAN
Principal Discharge DX:	Anemia  Goal:	no further complication from anemia  Instructions for follow-up, activity and diet:	Please follow up with Dr Pack in 2 weeks for results of bone marrow biopsy

## 2017-04-25 NOTE — DISCHARGE NOTE ADULT - PLAN OF CARE
no further complication from anemia Please follow up with Dr Pack in 2 weeks for results of bone marrow biopsy

## 2017-04-25 NOTE — PROGRESS NOTE ADULT - SUBJECTIVE AND OBJECTIVE BOX
Patient is a 89y old  Male who presents with a chief complaint of Dyspnea (20 Apr 2017 20:08)    MEDICAL PROBLEMS:  ANEMIA  SHORTNESS OF BREATH  Anemia  Anemia  Anemia, unspecified type  Anemia      INTERVAL HPI/OVERNIGHT EVENTS: S/P Bone marrow study.     MEDICATIONS  (STANDING):  dextrose 5% + sodium chloride 0.45%. 1000milliLiter(s) IV Continuous <Continuous>  pantoprazole    Tablet 40milliGRAM(s) Oral before breakfast  sucralfate suspension 1Gram(s) Oral four times a day    MEDICATIONS  (PRN):    Allergies    No Known Allergies    Intolerances      Vital Signs Last 24 Hrs  T(C): 36.6, Max: 37.3 (04-24 @ 17:11)  T(F): 97.8, Max: 99.2 (04-24 @ 17:11)  HR: 101 (67 - 101)  BP: 134/73 (121/52 - 152/76)  BP(mean): --  RR: 18 (17 - 18)  SpO2: 99% (94% - 100%)    PHYSICAL EXAM:  GENERAL: NAD, well-groomed, well-developed  HEAD:  Atraumatic, Normocephalic  EYES: EOMI, PERRLA, conjunctiva and sclera clear  ENMT: No tonsillar erythema, exudates, or enlargement; Moist mucous membranes, Good dentition, No lesions  NECK: Supple, No JVD, Normal thyroid  NERVOUS SYSTEM:  Alert & Oriented X3, Good concentration; Motor Strength 5/5 B/L upper and lower extremities; DTRs 2+ intact and symmetric  CHEST/LUNG: Clear to percussion bilaterally; No rales, rhonchi, wheezing, or rubs  HEART: Regular rate and rhythm; No murmurs, rubs, or gallops  ABDOMEN: Soft, Nontender, Nondistended; Bowel sounds present  EXTREMITIES:  2+ Peripheral Pulses, No clubbing, cyanosis, or edema  LYMPH: No lymphadenopathy noted  SKIN: No rashes or lesions    I & Os for current day (as of 04-25 @ 08:30)  =============================================  IN: 1290 ml / OUT: 1600 ml / NET: -310 ml      LABS:                        9.4    6.3   )-----------( 255      ( 25 Apr 2017 07:27 )             25.7               CAPILLARY BLOOD GLUCOSE    RADIOLOGY & ADDITIONAL TESTS:    Imaging Personally Reviewed:  [X] YES  [ ] NO    Consultant(s) Notes Reviewed:  [X] YES  [ ] NO    Care Discussed with Consultants/Other Providers [X] YES  [ ] NO

## 2017-04-26 LAB
CULTURE RESULTS: SIGNIFICANT CHANGE UP
SPECIMEN SOURCE: SIGNIFICANT CHANGE UP

## 2017-04-27 DIAGNOSIS — Z95.1 PRESENCE OF AORTOCORONARY BYPASS GRAFT: ICD-10-CM

## 2017-04-27 DIAGNOSIS — D64.9 ANEMIA, UNSPECIFIED: ICD-10-CM

## 2017-04-27 DIAGNOSIS — Z53.29 PROCEDURE AND TREATMENT NOT CARRIED OUT BECAUSE OF PATIENT'S DECISION FOR OTHER REASONS: ICD-10-CM

## 2017-04-27 DIAGNOSIS — N28.9 DISORDER OF KIDNEY AND URETER, UNSPECIFIED: ICD-10-CM

## 2017-04-27 DIAGNOSIS — E86.0 DEHYDRATION: ICD-10-CM

## 2017-04-27 DIAGNOSIS — Z87.891 PERSONAL HISTORY OF NICOTINE DEPENDENCE: ICD-10-CM

## 2017-04-27 DIAGNOSIS — I25.10 ATHEROSCLEROTIC HEART DISEASE OF NATIVE CORONARY ARTERY WITHOUT ANGINA PECTORIS: ICD-10-CM

## 2017-04-27 DIAGNOSIS — R06.00 DYSPNEA, UNSPECIFIED: ICD-10-CM

## 2017-05-01 LAB
SURGICAL PATHOLOGY FINAL REPORT - CH: SIGNIFICANT CHANGE UP
TM INTERPRETATION: SIGNIFICANT CHANGE UP

## 2017-05-08 LAB — CHROM ANALY OVERALL INTERP SPEC-IMP: SIGNIFICANT CHANGE UP

## 2022-05-12 NOTE — DISCHARGE NOTE ADULT - NS MD DC PLAN IMMU FLU REF OTH
Out of season I will STOP taking the medications listed below when I get home from the hospital:  None

## 2022-09-12 NOTE — PATIENT PROFILE ADULT. - AS SC BRADEN MOISTURE
(4) rarely moist Albendazole Counseling:  I discussed with the patient the risks of albendazole including but not limited to cytopenia, kidney damage, nausea/vomiting and severe allergy.  The patient understands that this medication is being used in an off-label manner.

## 2023-05-02 NOTE — CONSULT NOTE ADULT - PROBLEM SELECTOR RECOMMENDATION 9
CBC  Anemia work up.  Transfusion.  Bone-Marrow exam after Consent.
Procedure to be performed today.  Pt NPO.  meds, vitals and labs reviewed
No

## 2025-04-14 NOTE — ED PROVIDER NOTE - CADM POA PRESS ULCER
..  Time: 0110    Old EKG Present:    [] Yes   [] No    Type:    [x] Standard Left   [] Right Sided   [] Posterior Sided   [] Rhythm Strip    Shown to:    
No